# Patient Record
Sex: MALE | Race: WHITE | NOT HISPANIC OR LATINO | Employment: UNEMPLOYED | URBAN - METROPOLITAN AREA
[De-identification: names, ages, dates, MRNs, and addresses within clinical notes are randomized per-mention and may not be internally consistent; named-entity substitution may affect disease eponyms.]

---

## 2021-06-16 PROBLEM — K92.2 UPPER GI BLEED: Status: ACTIVE | Noted: 2017-07-10

## 2022-12-16 ENCOUNTER — HOSPITAL ENCOUNTER (OUTPATIENT)
Facility: CLINIC | Age: 32
Setting detail: OBSERVATION
Discharge: HOME | End: 2022-12-17
Attending: EMERGENCY MEDICINE | Admitting: STUDENT IN AN ORGANIZED HEALTH CARE EDUCATION/TRAINING PROGRAM
Payer: COMMERCIAL

## 2022-12-16 DIAGNOSIS — K92.0 HEMATEMESIS, UNSPECIFIED WHETHER NAUSEA PRESENT: ICD-10-CM

## 2022-12-16 DIAGNOSIS — Z78.9 ALCOHOL USE: Primary | ICD-10-CM

## 2022-12-16 LAB
ALBUMIN SERPL-MCNC: 4.2 G/DL (ref 3.5–5)
ALP SERPL-CCNC: 52 U/L (ref 45–120)
ALT SERPL W P-5'-P-CCNC: 37 U/L (ref 0–45)
ANION GAP SERPL CALCULATED.3IONS-SCNC: 12 MMOL/L (ref 5–18)
AST SERPL W P-5'-P-CCNC: 31 U/L (ref 0–40)
BASOPHILS # BLD AUTO: 0.1 10E3/UL (ref 0–0.2)
BASOPHILS NFR BLD AUTO: 1 %
BILIRUB DIRECT SERPL-MCNC: 0.2 MG/DL
BILIRUB SERPL-MCNC: 0.5 MG/DL (ref 0–1)
BUN SERPL-MCNC: 12 MG/DL (ref 8–22)
CALCIUM SERPL-MCNC: 8.8 MG/DL (ref 8.5–10.5)
CHLORIDE BLD-SCNC: 107 MMOL/L (ref 98–107)
CO2 SERPL-SCNC: 23 MMOL/L (ref 22–31)
CREAT SERPL-MCNC: 1.02 MG/DL (ref 0.7–1.3)
EOSINOPHIL # BLD AUTO: 0.3 10E3/UL (ref 0–0.7)
EOSINOPHIL NFR BLD AUTO: 4 %
ERYTHROCYTE [DISTWIDTH] IN BLOOD BY AUTOMATED COUNT: 11.6 % (ref 10–15)
ETHANOL SERPL-MCNC: 178 MG/DL
GFR SERPL CREATININE-BSD FRML MDRD: >90 ML/MIN/1.73M2
GLUCOSE BLD-MCNC: 97 MG/DL (ref 70–125)
HCT VFR BLD AUTO: 49.2 % (ref 40–53)
HGB BLD-MCNC: 15.6 G/DL (ref 13.3–17.7)
HGB BLD-MCNC: 16.3 G/DL (ref 13.3–17.7)
HGB BLD-MCNC: 17.1 G/DL (ref 13.3–17.7)
IMM GRANULOCYTES # BLD: 0 10E3/UL
IMM GRANULOCYTES NFR BLD: 0 %
INR PPP: 1.05 (ref 0.85–1.15)
LIPASE SERPL-CCNC: 31 U/L (ref 0–52)
LYMPHOCYTES # BLD AUTO: 2.5 10E3/UL (ref 0.8–5.3)
LYMPHOCYTES NFR BLD AUTO: 40 %
MAGNESIUM SERPL-MCNC: 2.1 MG/DL (ref 1.8–2.6)
MCH RBC QN AUTO: 31.6 PG (ref 26.5–33)
MCHC RBC AUTO-ENTMCNC: 34.8 G/DL (ref 31.5–36.5)
MCV RBC AUTO: 91 FL (ref 78–100)
MONOCYTES # BLD AUTO: 0.6 10E3/UL (ref 0–1.3)
MONOCYTES NFR BLD AUTO: 9 %
NEUTROPHILS # BLD AUTO: 2.7 10E3/UL (ref 1.6–8.3)
NEUTROPHILS NFR BLD AUTO: 46 %
NRBC # BLD AUTO: 0 10E3/UL
NRBC BLD AUTO-RTO: 0 /100
PHOSPHATE SERPL-MCNC: 2.6 MG/DL (ref 2.5–4.5)
PLATELET # BLD AUTO: 268 10E3/UL (ref 150–450)
POTASSIUM BLD-SCNC: 3.7 MMOL/L (ref 3.5–5)
PROT SERPL-MCNC: 7.3 G/DL (ref 6–8)
RBC # BLD AUTO: 5.41 10E6/UL (ref 4.4–5.9)
SODIUM SERPL-SCNC: 142 MMOL/L (ref 136–145)
WBC # BLD AUTO: 6.1 10E3/UL (ref 4–11)

## 2022-12-16 PROCEDURE — 258N000003 HC RX IP 258 OP 636: Performed by: STUDENT IN AN ORGANIZED HEALTH CARE EDUCATION/TRAINING PROGRAM

## 2022-12-16 PROCEDURE — C9113 INJ PANTOPRAZOLE SODIUM, VIA: HCPCS | Performed by: EMERGENCY MEDICINE

## 2022-12-16 PROCEDURE — 85610 PROTHROMBIN TIME: CPT | Performed by: EMERGENCY MEDICINE

## 2022-12-16 PROCEDURE — G0378 HOSPITAL OBSERVATION PER HR: HCPCS

## 2022-12-16 PROCEDURE — 250N000011 HC RX IP 250 OP 636: Performed by: STUDENT IN AN ORGANIZED HEALTH CARE EDUCATION/TRAINING PROGRAM

## 2022-12-16 PROCEDURE — 96374 THER/PROPH/DIAG INJ IV PUSH: CPT

## 2022-12-16 PROCEDURE — 82248 BILIRUBIN DIRECT: CPT | Performed by: EMERGENCY MEDICINE

## 2022-12-16 PROCEDURE — 250N000013 HC RX MED GY IP 250 OP 250 PS 637: Performed by: STUDENT IN AN ORGANIZED HEALTH CARE EDUCATION/TRAINING PROGRAM

## 2022-12-16 PROCEDURE — 84100 ASSAY OF PHOSPHORUS: CPT | Performed by: STUDENT IN AN ORGANIZED HEALTH CARE EDUCATION/TRAINING PROGRAM

## 2022-12-16 PROCEDURE — C9113 INJ PANTOPRAZOLE SODIUM, VIA: HCPCS | Performed by: STUDENT IN AN ORGANIZED HEALTH CARE EDUCATION/TRAINING PROGRAM

## 2022-12-16 PROCEDURE — 83735 ASSAY OF MAGNESIUM: CPT | Performed by: STUDENT IN AN ORGANIZED HEALTH CARE EDUCATION/TRAINING PROGRAM

## 2022-12-16 PROCEDURE — 96376 TX/PRO/DX INJ SAME DRUG ADON: CPT

## 2022-12-16 PROCEDURE — 250N000011 HC RX IP 250 OP 636: Performed by: EMERGENCY MEDICINE

## 2022-12-16 PROCEDURE — 85025 COMPLETE CBC W/AUTO DIFF WBC: CPT | Performed by: EMERGENCY MEDICINE

## 2022-12-16 PROCEDURE — 96375 TX/PRO/DX INJ NEW DRUG ADDON: CPT

## 2022-12-16 PROCEDURE — 82077 ASSAY SPEC XCP UR&BREATH IA: CPT | Performed by: EMERGENCY MEDICINE

## 2022-12-16 PROCEDURE — 36415 COLL VENOUS BLD VENIPUNCTURE: CPT | Performed by: STUDENT IN AN ORGANIZED HEALTH CARE EDUCATION/TRAINING PROGRAM

## 2022-12-16 PROCEDURE — 80053 COMPREHEN METABOLIC PANEL: CPT | Performed by: EMERGENCY MEDICINE

## 2022-12-16 PROCEDURE — 99285 EMERGENCY DEPT VISIT HI MDM: CPT | Mod: 25

## 2022-12-16 PROCEDURE — 250N000009 HC RX 250: Performed by: STUDENT IN AN ORGANIZED HEALTH CARE EDUCATION/TRAINING PROGRAM

## 2022-12-16 PROCEDURE — 83690 ASSAY OF LIPASE: CPT | Performed by: EMERGENCY MEDICINE

## 2022-12-16 PROCEDURE — 36415 COLL VENOUS BLD VENIPUNCTURE: CPT | Performed by: EMERGENCY MEDICINE

## 2022-12-16 PROCEDURE — 258N000003 HC RX IP 258 OP 636: Performed by: EMERGENCY MEDICINE

## 2022-12-16 PROCEDURE — 85018 HEMOGLOBIN: CPT | Performed by: STUDENT IN AN ORGANIZED HEALTH CARE EDUCATION/TRAINING PROGRAM

## 2022-12-16 PROCEDURE — 96361 HYDRATE IV INFUSION ADD-ON: CPT

## 2022-12-16 PROCEDURE — 99218 PR INITIAL OBSERVATION CARE,LEVEL I: CPT | Performed by: STUDENT IN AN ORGANIZED HEALTH CARE EDUCATION/TRAINING PROGRAM

## 2022-12-16 RX ORDER — MULTIVITAMIN,THERAPEUTIC
1 TABLET ORAL DAILY
COMMUNITY

## 2022-12-16 RX ORDER — ONDANSETRON 2 MG/ML
4 INJECTION INTRAMUSCULAR; INTRAVENOUS EVERY 6 HOURS PRN
Status: DISCONTINUED | OUTPATIENT
Start: 2022-12-16 | End: 2022-12-17

## 2022-12-16 RX ORDER — GABAPENTIN 600 MG/1
1200 TABLET ORAL ONCE
Status: COMPLETED | OUTPATIENT
Start: 2022-12-16 | End: 2022-12-16

## 2022-12-16 RX ORDER — DIAZEPAM 5 MG
10 TABLET ORAL EVERY 30 MIN PRN
Status: DISCONTINUED | OUTPATIENT
Start: 2022-12-16 | End: 2022-12-17 | Stop reason: HOSPADM

## 2022-12-16 RX ORDER — FLUMAZENIL 0.1 MG/ML
0.2 INJECTION, SOLUTION INTRAVENOUS
Status: DISCONTINUED | OUTPATIENT
Start: 2022-12-16 | End: 2022-12-17 | Stop reason: HOSPADM

## 2022-12-16 RX ORDER — GABAPENTIN 100 MG/1
100 CAPSULE ORAL EVERY 8 HOURS
Status: DISCONTINUED | OUTPATIENT
Start: 2022-12-23 | End: 2022-12-17 | Stop reason: HOSPADM

## 2022-12-16 RX ORDER — HALOPERIDOL 5 MG/ML
2.5-5 INJECTION INTRAMUSCULAR EVERY 6 HOURS PRN
Status: DISCONTINUED | OUTPATIENT
Start: 2022-12-16 | End: 2022-12-17 | Stop reason: HOSPADM

## 2022-12-16 RX ORDER — FOLIC ACID 1 MG/1
1 TABLET ORAL DAILY
Status: DISCONTINUED | OUTPATIENT
Start: 2022-12-17 | End: 2022-12-17 | Stop reason: HOSPADM

## 2022-12-16 RX ORDER — ONDANSETRON 4 MG/1
4 TABLET, ORALLY DISINTEGRATING ORAL EVERY 6 HOURS PRN
Status: DISCONTINUED | OUTPATIENT
Start: 2022-12-16 | End: 2022-12-17

## 2022-12-16 RX ORDER — TRAZODONE HYDROCHLORIDE 50 MG/1
50 TABLET, FILM COATED ORAL AT BEDTIME
Status: DISCONTINUED | OUTPATIENT
Start: 2022-12-16 | End: 2022-12-17 | Stop reason: HOSPADM

## 2022-12-16 RX ORDER — MULTIPLE VITAMINS W/ MINERALS TAB 9MG-400MCG
1 TAB ORAL DAILY
Status: DISCONTINUED | OUTPATIENT
Start: 2022-12-17 | End: 2022-12-17 | Stop reason: HOSPADM

## 2022-12-16 RX ORDER — GABAPENTIN 300 MG/1
900 CAPSULE ORAL EVERY 8 HOURS
Status: DISCONTINUED | OUTPATIENT
Start: 2022-12-16 | End: 2022-12-17 | Stop reason: HOSPADM

## 2022-12-16 RX ORDER — DIAZEPAM 10 MG/2ML
5-10 INJECTION, SOLUTION INTRAMUSCULAR; INTRAVENOUS EVERY 30 MIN PRN
Status: DISCONTINUED | OUTPATIENT
Start: 2022-12-16 | End: 2022-12-17 | Stop reason: HOSPADM

## 2022-12-16 RX ORDER — GABAPENTIN 300 MG/1
300 CAPSULE ORAL EVERY 8 HOURS
Status: DISCONTINUED | OUTPATIENT
Start: 2022-12-21 | End: 2022-12-17 | Stop reason: HOSPADM

## 2022-12-16 RX ORDER — CLONIDINE HYDROCHLORIDE 0.1 MG/1
0.1 TABLET ORAL EVERY 8 HOURS
Status: DISCONTINUED | OUTPATIENT
Start: 2022-12-16 | End: 2022-12-17 | Stop reason: HOSPADM

## 2022-12-16 RX ORDER — GABAPENTIN 300 MG/1
600 CAPSULE ORAL EVERY 8 HOURS
Status: DISCONTINUED | OUTPATIENT
Start: 2022-12-19 | End: 2022-12-17 | Stop reason: HOSPADM

## 2022-12-16 RX ORDER — TRAZODONE HYDROCHLORIDE 50 MG/1
50 TABLET, FILM COATED ORAL AT BEDTIME
COMMUNITY

## 2022-12-16 RX ADMIN — GABAPENTIN 900 MG: 300 CAPSULE ORAL at 21:03

## 2022-12-16 RX ADMIN — PANTOPRAZOLE SODIUM 40 MG: 40 INJECTION, POWDER, FOR SOLUTION INTRAVENOUS at 21:03

## 2022-12-16 RX ADMIN — ONDANSETRON 4 MG: 2 INJECTION INTRAMUSCULAR; INTRAVENOUS at 13:23

## 2022-12-16 RX ADMIN — SODIUM CHLORIDE 1000 ML: 9 INJECTION, SOLUTION INTRAVENOUS at 07:34

## 2022-12-16 RX ADMIN — PANTOPRAZOLE SODIUM 40 MG: 40 INJECTION, POWDER, FOR SOLUTION INTRAVENOUS at 07:34

## 2022-12-16 RX ADMIN — CLONIDINE HYDROCHLORIDE 0.1 MG: 0.1 TABLET ORAL at 21:04

## 2022-12-16 RX ADMIN — CLONIDINE HYDROCHLORIDE 0.1 MG: 0.1 TABLET ORAL at 13:03

## 2022-12-16 RX ADMIN — THIAMINE HYDROCHLORIDE: 100 INJECTION, SOLUTION INTRAMUSCULAR; INTRAVENOUS at 13:57

## 2022-12-16 RX ADMIN — GABAPENTIN 1200 MG: 600 TABLET, FILM COATED ORAL at 13:03

## 2022-12-16 RX ADMIN — TRAZODONE HYDROCHLORIDE 50 MG: 50 TABLET ORAL at 21:03

## 2022-12-16 ASSESSMENT — ACTIVITIES OF DAILY LIVING (ADL)
ADLS_ACUITY_SCORE: 18
ADLS_ACUITY_SCORE: 18
ADLS_ACUITY_SCORE: 35
ADLS_ACUITY_SCORE: 18
DEPENDENT_IADLS:: INDEPENDENT
ADLS_ACUITY_SCORE: 35
ADLS_ACUITY_SCORE: 18
ADLS_ACUITY_SCORE: 35

## 2022-12-16 NOTE — CONSULTS
Care Management Initial Consult    General Information  Assessment completed with: Patient,    Type of CM/SW Visit: Initial Assessment    Primary Care Provider verified and updated as needed: No   Readmission within the last 30 days: no previous admission in last 30 days      Reason for Consult: discharge planning, substance use concerns  Advance Care Planning:            Communication Assessment  Patient's communication style: spoken language (English or Bilingual)             Cognitive  Cognitive/Neuro/Behavioral: WDL                      Living Environment:   People in home: parent(s)     Current living Arrangements: house      Able to return to prior arrangements: yes       Family/Social Support:  Care provided by: self  Provides care for: no one  Marital Status: Single  Parent(s)          Description of Support System: Supportive, Involved    Support Assessment: Adequate family and caregiver support, Adequate social supports    Current Resources:   Patient receiving home care services: No     Community Resources: None  Equipment currently used at home: none  Supplies currently used at home: None    Employment/Financial:  Employment Status: unemployed        Financial Concerns: No concerns identified   Referral to Financial Worker: No       Lifestyle & Psychosocial Needs:  Social Determinants of Health     Tobacco Use: Unknown     Smoking Tobacco Use: Never     Smokeless Tobacco Use: Unknown     Passive Exposure: Not on file   Alcohol Use: Not on file   Financial Resource Strain: Not on file   Food Insecurity: Not on file   Transportation Needs: Not on file   Physical Activity: Not on file   Stress: Not on file   Social Connections: Not on file   Intimate Partner Violence: Not on file   Depression: Not on file   Housing Stability: Not on file       Functional Status:  Prior to admission patient needed assistance:   Dependent ADLs:: Independent  Dependent IADLs:: Independent  Assesssment of Functional Status: At  functional baseline    Mental Health Status:  Mental Health Status: No Current Concerns       Chemical Dependency Status:  Chemical Dependency Status: No Current Concerns             Values/Beliefs:  Spiritual, Cultural Beliefs, Uatsdin Practices, Values that affect care: no               Additional Information:  CINTHIA assessed. Pt resides at home with his parents, he is independent at baseline, but does not work or drive. Pt endorses hx of alcohol abuse, he reports starting to drink at 16 years old, and states that he has never had a period of sobriety since then. He does report completing an outpatient CD program about a year ago and states that he is interested in another OP CD program. CM placed Rule 25 information in pt AVS.    FRANK Zarate

## 2022-12-16 NOTE — PHARMACY-ADMISSION MEDICATION HISTORY
Pharmacy Note - Admission Medication History    Pertinent Provider Information:  None     ______________________________________________________________________    Prior To Admission (PTA) med list completed and updated in EMR.       PTA Med List   Medication Sig Last Dose     multivitamin, therapeutic (THERA-VIT) TABS tablet Take 1 tablet by mouth daily Past Week     traZODone (DESYREL) 50 MG tablet Take 50 mg by mouth At Bedtime Past Week       Information source(s): [facility]  Method of interview communication: in-person    Summary of Changes to PTA Med List  New: Trazodone  Discontinued: NA  Changed: NA    Patient was asked about OTC/herbal products specifically.  PTA med list reflects this.    In the past week, patient estimated taking medication this percent of the time:  Unable to assess    Allergies were reviewed, assessed, and updated with the patient.      Patient does not use any multi-dose medications prior to admission.    The information provided in this note is only as accurate as the sources available at the time of the update(s).    Thank you for the opportunity to participate in the care of this patient.    Swathi Figueroa RPH  12/16/2022 11:00 AM

## 2022-12-16 NOTE — DISCHARGE INSTRUCTIONS
In order to get into chemical dependency treatment you must follow these steps:   Complete a Rule 25 assessment with any of the providers listed below.     [facility]  [phone]  [address] [place] [id number]  Patients with Medical Assistance from [place] or [facility] are covered, otherwise you need to have a PMAP or private insurance.  Rule 25s are available at clinics in [place], [facility], [place], and [place].  Call ahead to schedule an appointment, no walk in availability.     [facility]  [phone]  [address] [address]  Assessments done Monday through Friday 8am-7pm.  Call to schedule an appointment, walk ins are accommodated if possible.  Insurance coverage is verified.  If insurance covers 80 percent or more a payment schedule may be arranged with the business office.     [facility]  [phone]  [address] [place] [address]  Call to schedule an assessment.  Locations include [facility], [facility], and [place].    [facility]  [phone] - Press [address]  Call to schedule an assessment.  No walk in availability.  Only able to accept PMAP and private insurance.  Locations include [place], [facility], [place], [place], [place], [place], [place], [facility], and [place].

## 2022-12-16 NOTE — H&P
"[facility] MEDICINE ADMISSION HISTORY AND PHYSICAL     Assessment & Plan       Patrick Ba is a 32 year man with past medical history significant for insomnia, alcohol dependence who presented to the hospital on 12/16 with 1 episodes of hematemesis.      #Epistaxis  #Questionable hematemesis  -Epistaxis with possible hematemesis after an episode of vomiting.  -No further episode of vomiting since admission.  -Possible etiologies include epistaxis.   -If the patient truly had hematemesis, possible etiologies include esophageal submucosal tear in the setting of vomiting, although less likely given no reported pain and only 1 episode of vomiting.  Other possible etiologies include PUD versus varices bleeding.    Plan:  [] Check hemoglobin  [] Continue to monitor vitals  [] If the patient had another episode of hematemesis or melena, will consult GI otherwise he can follow-up with primary care physician and GI as an outpatient.  [] Start PPI 40 mg BID      #Alcohol use disorder  -Started drinking at the age of 17  -Drinks around 1 bottle of hard alcohol (750 mL) per day.  -No history of cirrhosis.  -Liver enzymes and INR within normal limits.    Plan:  [] CIWA protocol  [] Gabapentin tapering dose  [] Start thiamine and folic acid  []  consult        #Insomnia    Plan:  [] Continue home trazodone.        Clinically Significant Risk Factors Present on Admission                       # Overweight: Estimated body mass index is 28.7 kg/m  as calculated from the following:    Height as of this encounter: 1.778 m (5' 10\").    Weight as of this encounter: 90.7 kg (200 lb).             DVTP: Low Risk Patient   Code Status: No Order  Disposition: Observation   Expected LOS: 1 day  Goals for the hospitalization: Monitor for any signs of GI bleed.  Disposition Plan Home     Expected Discharge Date: 12/17/2022                The patient's care was discussed with the Patient.  Chief Complaint "  Bosebleed and possible hematemesis     HISTORY     Patrick Ba is a 32 year man with past medical history significant for alcohol use disorder who presented to the hospital with epistaxis versus an episode of hematemesis.    Patient was in usual state of health until earlier today 12/16/2022, he was trying to have breakfast.  He had a few bites of a sandwich and shortly after he started to experience nausea and had 1 episode of vomiting, bright bloody material along with nosebleed.  The nosebleed has subsided spontaneously within a few minutes.     Patient denies any abdominal pain, melena, hematochezia, chest pain or shortness of breath.      Vitals on presentation blood pressure around 150/80, heart rate around 100, SPO2 96% on room air.  Labs is largely unremarkable, no leukocytosis, liver enzymes within normal limits, bilirubin normal.  Alcohol level elevated around 180.  Patient drinks around a bottle of vodka daily and his last alcoholic drink was yesterday 12/15/2022.  He never experienced alcohol withdrawal symptoms in the past as he never stopped drinking alcohol.  He started drinking around age of 16.       Past Medical History     No past medical history on file.     Surgical History   History reviewed. No pertinent surgical history.  Family History    Reviewed, and History reviewed. No pertinent family history.     Social History      Social History     Tobacco Use     Smoking status: Never   Substance Use Topics     Alcohol use: Yes     Comment: Alcoholic Drinks/day: 1/2 liter per day of hard alcohol      Drug use: Yes     Types: Marijuana        Allergies   No Known Allergies  Prior to Admission Medications      Prior to Admission Medications   Prescriptions Last Dose Informant Patient Reported? Taking?   multivitamin, therapeutic (THERA-VIT) TABS tablet Past Week  Yes Yes   Sig: Take 1 tablet by mouth daily   traZODone (DESYREL) 50 MG tablet Past Week  Yes Yes   Sig: Take 50 mg by mouth At Bedtime       Facility-Administered Medications: None      Review of Systems     A 12 point comprehensive review of systems was negative except as noted above in HPI.    PHYSICAL EXAMINATION       Vitals      Temp:  [98.8  F (37.1  C)] 98.8  F (37.1  C)  Pulse:  [] 80  Resp:  [20] 20  BP: (118-150)/() 127/82  SpO2:  [95 %-97 %] 96 %    Examination     Physical Exam  Constitutional:       General: He is not in acute distress.     Appearance: Normal appearance. He is not ill-appearing.   HENT:      Nose: Nose normal. No congestion or rhinorrhea.      Mouth/Throat:      Mouth: Mucous membranes are moist.      Pharynx: Oropharynx is clear.   Cardiovascular:      Rate and Rhythm: Normal rate.      Heart sounds: Murmur heard.   Pulmonary:      Effort: Pulmonary effort is normal. No respiratory distress.      Breath sounds: No wheezing.   Abdominal:      General: Abdomen is flat. There is no distension.      Palpations: Abdomen is soft.      Tenderness: There is no abdominal tenderness. There is no guarding or rebound.   Skin:     General: Skin is warm and dry.   Neurological:      General: No focal deficit present.      Mental Status: He is alert and oriented to person, place, and time. Mental status is at baseline.      Cranial Nerves: No cranial nerve deficit.      Motor: No weakness.   Psychiatric:         Mood and Affect: Mood normal.         Behavior: Behavior normal.         Pertinent Lab     Most Recent 3 CBC's:Recent Labs   Lab Test 12/16/22  0723   WBC 6.1   HGB 17.1   MCV 91        Most Recent 3 BMP's:Recent Labs   Lab Test 12/16/22  0723      POTASSIUM 3.7   CHLORIDE 107   CO2 23   BUN 12   CR 1.02   ANIONGAP 12   ADELA 8.8   GLC 97         Pertinent Radiology     Radiology Results: No results found for this or any previous visit (from the past 24 hour(s)).      Advance Care Planning        NATE PEREZ MD  [facility]   Phone:  #[phone]

## 2022-12-16 NOTE — ED TRIAGE NOTES
Pt presents to the ED with c/o of sudden onset of nausea followed by emesis x 1.  Pt reports bright red blood in his vomit. Pt has hx of ETOH. Last drink was last night. Pt reports this happening about 5 years ago.      Triage Assessment     Row Name 12/16/22 0634       Triage Assessment (Adult)    Airway WDL WDL       Respiratory WDL    Respiratory WDL WDL       Skin Circulation/Temperature WDL    Skin Circulation/Temperature WDL WDL       Cardiac WDL    Cardiac WDL WDL       Peripheral/Neurovascular WDL    Peripheral Neurovascular WDL WDL       Cognitive/Neuro/Behavioral WDL    Cognitive/Neuro/Behavioral WDL WDL

## 2022-12-16 NOTE — ED PROVIDER NOTES
EMERGENCY DEPARTMENT ENCOUNTER      NAME: Patrick Ba  AGE: 32 year old male  YOB: 1990  MRN: [id number]  EVALUATION DATE & TIME: No admission date for patient encounter.    PCP: No Ref-Primary, Physician    ED PROVIDER: Cade Brownlee D.O.      Chief Complaint   Patient presents with     Hematemesis     Hx of ETOH       FINAL IMPRESSION:  1. Hematemesis, unspecified whether nausea present        ED COURSE & MEDICAL DECISION MAKIN:38 AM I met with the patient to gather history and to perform my initial exam. I discussed the plan for care while in the Emergency Department. PPE worn: surgical mask, gloves   7:03 AM I spoke to Dr. Boyle, [place]. Recommended to continue observation. If patient vomits, then place order for EGD. Otherwise, reevaluate and discuss for a new plan.   8:19 AM I discussed the case with hospitalist, Dr Sauceda, who accepts the patient.         Pertinent Labs & Imaging studies reviewed. (See chart for details)  32 year old male presents to the Emergency Department for evaluation of vomiting blood.  Patient had a similar episode several years ago, never did follow-up for his EGD.  There is no abdominal pain on exam, his hemoglobin is stable, and he was tachycardic upon arrival that is did improve after IV fluids.  Imaging was not performed as there is no pain.  I did give Protonix, but did not give octreotide.  I discussed the patient with gastroenterology.  This time the plan will be for observation and serial hemoglobins.  Do not believe that an emergent EGD is necessary, but that may change as the patient is monitored.  Discussed with the hospitalist who agreed to the observation.    Medical Decision Making    History:    Supplemental history from: Documented in HPI, if applicable    External Record(s) reviewed: Documented in HPI, if applicable.    Work Up:    Chart documentation includes differential considered and any EKGs or imaging interpreted by provider.    In  "additional to work up documented, I considered the following work up: See chart documentation, if applicable.    External consultation:    Discussion of management with another provider: Hospitalist and GI    Complicating factors:    Care impacted by chronic illness: None    Care affected by social determinants of health: Alcohol Abuse and/or Recreational Drug Use    Disposition considerations: Admit.        At the conclusion of the encounter I discussed the results of all of the tests and the disposition. The questions were answered. The patient or family acknowledged understanding and was agreeable with the care plan.        HPI    Patient information was obtained from: Patient     Use of : N/A        Patrick Ba is a 32 year old male who presents hematemesis.       Approximately 30 minutes prior to arrival, the patient reports that he was eating Doritos when he developed nausea and had an episode of hematemesis. He adds that he had a nosebleed afterwards. He denies black/bloody stools and abdominal pain. The patient states that he had similar symptoms ~5 years ago, but never had a follow-up. He denies having any medical issues, but reports of ETOH use. The patient states that he has been an alcoholic for the past ~15 years and finishes several 750 bottles 2-3 times throughout the week. Currently in the ED, he feels \"fine.\" He otherwise denies fevers, chest pain, shortness of breath, and any other symptoms or complaints at this time.     Per chart review, the patient was admitted to the hospital 7/9/17-7/10/17 (~5 years) for UGIB with hematemesis. Gastric occult blood was positive consistent with his hematemesis. There was a slight leukocytosis of 11.4. Basic metabolic panel was unremarkable except for slight elevation of bilirubin of 1.5, rest of the LFTs are normal. Renal function was normal. Lipase was normal. INR was normal. Chest x-ray was unremarkable. HGB was steady 16.5; nl INR. GI consulted - " "Dr Snyder reviewed case and recommended that patient can have EGD as outpatient as he has been stable.    Social Hx: Alcohol User. Former Smoker.     REVIEW OF SYSTEMS  Constitutional:  Denies fever, chills, weight loss or weakness  Eyes:  No pain, discharge, redness  HENT: Positive for nosebleed. Denies sore throat, ear pain, congestion  Respiratory: No SOB, wheeze or cough  Cardiovascular:  No CP, palpitations  GI: Positive for nausea and hematemesis. Denies abdominal pain, diarrhea, black/bloody stools  : Denies dysuria, hematuria  Musculoskeletal:  Denies any new muscle/joint pain, swelling or loss of function.  Skin:  Denies rash, pallor  Neurologic:  Denies headache, focal weakness or sensory changes  Lymph: Denies swollen nodes    All other systems negative unless noted in HPI.    PAST MEDICAL HISTORY:  History reviewed. No pertinent past medical history.    PAST SURGICAL HISTORY:  History reviewed. No pertinent surgical history.      CURRENT MEDICATIONS:    No current facility-administered medications for this encounter.     Current Outpatient Medications   Medication     cyclobenzaprine (FLEXERIL) 10 MG tablet     omeprazole (PRILOSEC) 20 MG capsule         ALLERGIES:  No Known Allergies    FAMILY HISTORY:  History reviewed. No pertinent family history.    SOCIAL HISTORY:  Social History     Socioeconomic History     Marital status: Single   Tobacco Use     Smoking status: Never   Substance and Sexual Activity     Alcohol use: Yes     Comment: Alcoholic Drinks/day: 1/2 liter per day of hard alcohol      Drug use: Yes     Types: Marijuana       VITALS:  Patient Vitals for the past 24 hrs:   BP Temp Temp src Pulse Resp SpO2 Height Weight   12/16/22 0631 (!) 150/108 98.8  F (37.1  C) Oral 113 20 95 % 1.778 m (5' 10\") 90.7 kg (200 lb)       PHYSICAL EXAM    VITAL SIGNS: BP (!) 150/108   Pulse 113   Temp 98.8  F (37.1  C) (Oral)   Resp 20   Ht 1.778 m (5' 10\")   Wt 90.7 kg (200 lb)   SpO2 95%   BMI " 28.70 kg/m      General Appearance: Well-appearing, well-nourished, no acute distress   Head:  Normocephalic, without obvious abnormality, atraumatic  Eyes:  PERRL, conjunctiva/corneas clear, EOM's intact,  ENT:  Lips, mucosa, and tongue normal, membranes are moist without pallor  Neck:  Normal ROM, symmetrical, trachea midline    Cardio:  Borderline tachycardic and regular rhythm, no murmur, rub or gallop, 2+ pulses symmetric in all extremities  Pulm:  Clear to auscultation bilaterally, respirations unlabored,  Abdomen:  Soft, non-tender, no rebound or guarding.  Musculoskeletal: Full ROM, no edema, no cyanosis, good ROM of major joints  Integument:  Warm, Dry, No erythema, No rash.    Neurologic:  Alert & oriented.  No focal deficits appreciated.  Ambulatory.  Psychiatric:  Affect normal, Judgment normal, Mood normal.      LABS  Results for orders placed or performed during the hospital encounter of 12/16/22 (from the past 24 hour(s))   CBC with platelets + differential    Narrative    The following orders were created for panel order CBC with platelets + differential.  Procedure                               Abnormality         Status                     ---------                               -----------         ------                     CBC with platelets and d...[[id number]]                      Final result                 Please view results for these tests on the individual orders.   Basic metabolic panel   Result Value Ref Range    Sodium 142 136 - 145 mmol/L    Potassium 3.7 3.5 - 5.0 mmol/L    Chloride 107 98 - 107 mmol/L    Carbon Dioxide (CO2) 23 22 - 31 mmol/L    Anion Gap 12 5 - 18 mmol/L    Urea Nitrogen 12 8 - 22 mg/dL    Creatinine 1.02 0.70 - 1.30 mg/dL    Calcium 8.8 8.5 - 10.5 mg/dL    Glucose 97 70 - 125 mg/dL    GFR Estimate >90 >60 mL/min/1.73m2   Hepatic function panel   Result Value Ref Range    Bilirubin Total 0.5 0.0 - 1.0 mg/dL    Bilirubin Direct 0.2 <=0.5 mg/dL    Protein Total 7.3  6.0 - 8.0 g/dL    Albumin 4.2 3.5 - 5.0 g/dL    Alkaline Phosphatase 52 45 - 120 U/L    AST 31 0 - 40 U/L    ALT 37 0 - 45 U/L   Lipase   Result Value Ref Range    Lipase 31 0 - 52 U/L   INR   Result Value Ref Range    INR 1.05 0.85 - 1.15   Alcohol level blood   Result Value Ref Range    Alcohol, Blood 178 (H) None detected mg/dL   CBC with platelets and differential   Result Value Ref Range    WBC Count 6.1 4.0 - 11.0 10e3/uL    RBC Count 5.41 4.40 - 5.90 10e6/uL    Hemoglobin 17.1 13.3 - 17.7 g/dL    Hematocrit 49.2 40.0 - 53.0 %    MCV 91 78 - 100 fL    MCH 31.6 26.5 - 33.0 pg    MCHC 34.8 31.5 - 36.5 g/dL    RDW 11.6 10.0 - 15.0 %    Platelet Count 268 150 - 450 10e3/uL    % Neutrophils 46 %    % Lymphocytes 40 %    % Monocytes 9 %    % Eosinophils 4 %    % Basophils 1 %    % Immature Granulocytes 0 %    NRBCs per 100 WBC 0 <1 /100    Absolute Neutrophils 2.7 1.6 - 8.3 10e3/uL    Absolute Lymphocytes 2.5 0.8 - 5.3 10e3/uL    Absolute Monocytes 0.6 0.0 - 1.3 10e3/uL    Absolute Eosinophils 0.3 0.0 - 0.7 10e3/uL    Absolute Basophils 0.1 0.0 - 0.2 10e3/uL    Absolute Immature Granulocytes 0.0 <=0.4 10e3/uL    Absolute NRBCs 0.0 10e3/uL         RADIOLOGY  No orders to display            MEDICATIONS GIVEN IN THE EMERGENCY:  Medications   pantoprazole (PROTONIX) IV push injection 40 mg (40 mg Intravenous Given 12/16/22 0734)   0.9% sodium chloride BOLUS (1,000 mLs Intravenous New Bag 12/16/22 0734)       NEW PRESCRIPTIONS STARTED AT TODAY'S ER VISIT  New Prescriptions    No medications on file        I, Joni Segura, am serving as a scribe to document services personally performed by Cade Brownlee D.O., based on my observations and the provider's statements to me.  I, Cade Brownlee D.O., attest that Joni Segura is acting in a scribe capacity, has observed my performance of the services and has documented them in accordance with my direction.     Cade Brownlee D.O.  Emergency Medicine  [facility]  [place] EMERGENCY ROOM  [facility] [facility] [address]  [phone]  Dept: [phone]     Cade Brownlee DO  12/16/22 1116

## 2022-12-17 VITALS
HEART RATE: 86 BPM | OXYGEN SATURATION: 98 % | WEIGHT: 200 LBS | BODY MASS INDEX: 28.63 KG/M2 | DIASTOLIC BLOOD PRESSURE: 83 MMHG | HEIGHT: 70 IN | TEMPERATURE: 97.7 F | SYSTOLIC BLOOD PRESSURE: 125 MMHG | RESPIRATION RATE: 18 BRPM

## 2022-12-17 LAB
ALBUMIN SERPL-MCNC: 4 G/DL (ref 3.5–5)
ALP SERPL-CCNC: 51 U/L (ref 45–120)
ALT SERPL W P-5'-P-CCNC: 32 U/L (ref 0–45)
ANION GAP SERPL CALCULATED.3IONS-SCNC: 10 MMOL/L (ref 5–18)
AST SERPL W P-5'-P-CCNC: 25 U/L (ref 0–40)
BASOPHILS # BLD AUTO: 0.1 10E3/UL (ref 0–0.2)
BASOPHILS NFR BLD AUTO: 1 %
BILIRUB SERPL-MCNC: 1.4 MG/DL (ref 0–1)
BUN SERPL-MCNC: 10 MG/DL (ref 8–22)
CALCIUM SERPL-MCNC: 9.2 MG/DL (ref 8.5–10.5)
CHLORIDE BLD-SCNC: 106 MMOL/L (ref 98–107)
CO2 SERPL-SCNC: 22 MMOL/L (ref 22–31)
CREAT SERPL-MCNC: 0.92 MG/DL (ref 0.7–1.3)
EOSINOPHIL # BLD AUTO: 0.4 10E3/UL (ref 0–0.7)
EOSINOPHIL NFR BLD AUTO: 6 %
ERYTHROCYTE [DISTWIDTH] IN BLOOD BY AUTOMATED COUNT: 11.3 % (ref 10–15)
GFR SERPL CREATININE-BSD FRML MDRD: >90 ML/MIN/1.73M2
GLUCOSE BLD-MCNC: 110 MG/DL (ref 70–125)
HCT VFR BLD AUTO: 47.3 % (ref 40–53)
HGB BLD-MCNC: 16.4 G/DL (ref 13.3–17.7)
IMM GRANULOCYTES # BLD: 0 10E3/UL
IMM GRANULOCYTES NFR BLD: 0 %
LYMPHOCYTES # BLD AUTO: 1.9 10E3/UL (ref 0.8–5.3)
LYMPHOCYTES NFR BLD AUTO: 29 %
MCH RBC QN AUTO: 30.9 PG (ref 26.5–33)
MCHC RBC AUTO-ENTMCNC: 34.7 G/DL (ref 31.5–36.5)
MCV RBC AUTO: 89 FL (ref 78–100)
MONOCYTES # BLD AUTO: 0.6 10E3/UL (ref 0–1.3)
MONOCYTES NFR BLD AUTO: 9 %
NEUTROPHILS # BLD AUTO: 3.5 10E3/UL (ref 1.6–8.3)
NEUTROPHILS NFR BLD AUTO: 55 %
NRBC # BLD AUTO: 0 10E3/UL
NRBC BLD AUTO-RTO: 0 /100
PLATELET # BLD AUTO: 253 10E3/UL (ref 150–450)
POTASSIUM BLD-SCNC: 4 MMOL/L (ref 3.5–5)
PROT SERPL-MCNC: 6.6 G/DL (ref 6–8)
RBC # BLD AUTO: 5.3 10E6/UL (ref 4.4–5.9)
SODIUM SERPL-SCNC: 138 MMOL/L (ref 136–145)
WBC # BLD AUTO: 6.4 10E3/UL (ref 4–11)

## 2022-12-17 PROCEDURE — 85004 AUTOMATED DIFF WBC COUNT: CPT | Performed by: STUDENT IN AN ORGANIZED HEALTH CARE EDUCATION/TRAINING PROGRAM

## 2022-12-17 PROCEDURE — 80053 COMPREHEN METABOLIC PANEL: CPT | Performed by: STUDENT IN AN ORGANIZED HEALTH CARE EDUCATION/TRAINING PROGRAM

## 2022-12-17 PROCEDURE — 99217 PR OBSERVATION CARE DISCHARGE: CPT | Performed by: STUDENT IN AN ORGANIZED HEALTH CARE EDUCATION/TRAINING PROGRAM

## 2022-12-17 PROCEDURE — 96376 TX/PRO/DX INJ SAME DRUG ADON: CPT

## 2022-12-17 PROCEDURE — C9113 INJ PANTOPRAZOLE SODIUM, VIA: HCPCS | Performed by: STUDENT IN AN ORGANIZED HEALTH CARE EDUCATION/TRAINING PROGRAM

## 2022-12-17 PROCEDURE — 36415 COLL VENOUS BLD VENIPUNCTURE: CPT | Performed by: STUDENT IN AN ORGANIZED HEALTH CARE EDUCATION/TRAINING PROGRAM

## 2022-12-17 PROCEDURE — 250N000011 HC RX IP 250 OP 636: Performed by: STUDENT IN AN ORGANIZED HEALTH CARE EDUCATION/TRAINING PROGRAM

## 2022-12-17 PROCEDURE — 250N000013 HC RX MED GY IP 250 OP 250 PS 637: Performed by: STUDENT IN AN ORGANIZED HEALTH CARE EDUCATION/TRAINING PROGRAM

## 2022-12-17 PROCEDURE — G0378 HOSPITAL OBSERVATION PER HR: HCPCS

## 2022-12-17 RX ORDER — FOLIC ACID 1 MG/1
1 TABLET ORAL DAILY
Qty: 30 TABLET | Refills: 0 | Status: SHIPPED | OUTPATIENT
Start: 2022-12-18 | End: 2023-01-17

## 2022-12-17 RX ORDER — LANOLIN ALCOHOL/MO/W.PET/CERES
100 CREAM (GRAM) TOPICAL DAILY
Qty: 30 TABLET | Refills: 0 | Status: SHIPPED | OUTPATIENT
Start: 2022-12-18 | End: 2023-01-17

## 2022-12-17 RX ADMIN — FOLIC ACID 1 MG: 1 TABLET ORAL at 07:45

## 2022-12-17 RX ADMIN — GABAPENTIN 900 MG: 300 CAPSULE ORAL at 04:18

## 2022-12-17 RX ADMIN — Medication 100 MG: at 08:10

## 2022-12-17 RX ADMIN — MULTIPLE VITAMINS W/ MINERALS TAB 1 TABLET: TAB at 07:45

## 2022-12-17 RX ADMIN — CLONIDINE HYDROCHLORIDE 0.1 MG: 0.1 TABLET ORAL at 04:18

## 2022-12-17 RX ADMIN — PANTOPRAZOLE SODIUM 40 MG: 40 INJECTION, POWDER, FOR SOLUTION INTRAVENOUS at 08:49

## 2022-12-17 ASSESSMENT — ACTIVITIES OF DAILY LIVING (ADL)
ADLS_ACUITY_SCORE: 18

## 2022-12-17 NOTE — PROGRESS NOTES
"PRIMARY DIAGNOSIS: \"GENERIC\" NURSING  OUTPATIENT/OBSERVATION GOALS TO BE MET BEFORE DISCHARGE:  1. ADLs back to baseline: Yes    2. Activity and level of assistance: Ambulating independently.    3. Pain status: Pain free.    4. Return to near baseline physical activity: Yes     Discharge Planner Nurse   Safe discharge environment identified: Yes  Barriers to discharge: Yes; awaiting medical clearance.       Entered by: Alicia Dietz RN 12/16/2022 10:21 PM     Please review provider order for any additional goals.   Nurse to notify provider when observation goals have been met and patient is ready for discharge.    CIWA-0. VSS. Denies pain and abdominal discomfort. No N/V this shift. Will continue to monitor.   "

## 2022-12-17 NOTE — DISCHARGE SUMMARY
[facility] MEDICINE  DISCHARGE SUMMARY     Primary Care Physician: No Ref-Primary, Physician  Admission Date: 12/16/2022   Discharge Provider: NATE PEREZ MD Discharge Date: 12/17/2022   Diet:   Active Diet and Nourishment Order   Procedures     Regular Diet Adult       Code Status: Full Code   Activity: DCACTIVITY: Activity as tolerated        Condition at Discharge: Satisfactory     REASON FOR PRESENTATION(See Admission Note for Details)   Nosebleed and possible hematemesis    PRINCIPAL & ACTIVE DISCHARGE DIAGNOSES     Active Problems:    * No active hospital problems. *      PENDING LABS     Unresulted Labs Ordered in the Past 30 Days of this Admission     No orders found for last 31 day(s).          RECOMMENDATIONS TO OUTPATIENT PROVIDER FOR F/U VISIT   -Continue to encourage alcohol cessation  -Monitor liver function as an outpatient and consider discharge GI referral if removed.    DISPOSITION     Home    SUMMARY OF HOSPITAL COURSE:      Patrick Ba is a 32 year man with past medical history significant for insomnia, alcohol dependence who presented to the hospital on 12/16 with 1 episodes of epistaxis and possible hematemesis.  Hemoglobin has been stable.  No further episodes of hematemesis or epistaxis during this hospitalization.  Patient does not have any abdominal pain nausea or vomiting.  He has been tolerating his diet well.  No signs of active GI bleed.  No history of varices.  His liver enzymes was stable.         #Epistaxis  #Questionable hematemesis  -Epistaxis with possible hematemesis after an episode of vomiting.  -No further episode of vomiting since admission.  -Possible etiologies include epistaxis.   -No signs of active GI bleed.    Plan:  [] Continue to monitor status at home.  [] Follow-up with primary care physician as an outpatient.       #Alcohol use disorder  -Started drinking at the age of 17  -Drinks around 1 bottle of hard alcohol (750 mL)  per day.  -No history of cirrhosis.  -Liver enzymes and INR within normal limits.  -Was put on CIWA protocol during hospitalization.  No signs of alcohol withdrawal.  -Was started on tapering dose gabapentin along with thiamine and folic acid.  -Patient was educated about the importance of alcohol cessation.    Plan:    []  Discharge with folic and thiamine.       #Insomnia     Plan:  [] Continue home trazodone.    Discharge Medications with Med changes:     Current Discharge Medication List      START taking these medications    Details   folic acid (FOLVITE) 1 MG tablet Take 1 tablet (1 mg) by mouth daily for 30 days  Qty: 30 tablet, Refills: 0    Associated Diagnoses: Alcohol use      thiamine (B-1) 100 MG tablet Take 1 tablet (100 mg) by mouth daily for 30 days  Qty: 30 tablet, Refills: 0    Associated Diagnoses: Alcohol use         CONTINUE these medications which have NOT CHANGED    Details   multivitamin, therapeutic (THERA-VIT) TABS tablet Take 1 tablet by mouth daily      traZODone (DESYREL) 50 MG tablet Take 50 mg by mouth At Bedtime                   Consults     CARE MANAGEMENT / SOCIAL WORK IP CONSULT    Immunizations given this encounter     Most Recent Immunizations   Administered Date(s) Administered     COVID-19 Vaccine Bivalent Booster 12+ (Pfizer) 10/21/2022           Anticoagulation Information      Recent INR results:   Recent Labs   Lab 12/16/22  0723   INR 1.05           SIGNIFICANT IMAGING FINDINGS     No results found for this visit on 12/16/22.    SIGNIFICANT LABORATORY FINDINGS     Most Recent 3 CBC's:Recent Labs   Lab Test 12/17/22  0621 12/16/22  2207 12/16/22  1406 12/16/22  0723   WBC 6.4  --   --  6.1   HGB 16.4 15.6 16.3 17.1   MCV 89  --   --  91     --   --  268     Most Recent 3 BMP's:Recent Labs   Lab Test 12/17/22  0621 12/16/22  0723    142   POTASSIUM 4.0 3.7   CHLORIDE 106 107   CO2 22 23   BUN 10 12   CR 0.92 1.02   ANIONGAP 10 12   ADELA 9.2 8.8    97      Most Recent 2 LFT's:Recent Labs   Lab Test 12/17/22  0621 12/16/22  0723   AST 25 31   ALT 32 37   ALKPHOS 51 52   BILITOTAL 1.4* 0.5     Most Recent 3 INR's:Recent Labs   Lab Test 12/16/22  0723   INR 1.05           Discharge Orders     No discharge procedures on file.    Examination   Physical Exam   Temp:  [97.4  F (36.3  C)-99.1  F (37.3  C)] 97.7  F (36.5  C)  Pulse:  [69-74] 69  Resp:  [16-20] 16  BP: (111-153)/(74-93) 122/78  SpO2:  [96 %-98 %] 98 %  Wt Readings from Last 1 Encounters:   12/16/22 90.7 kg (200 lb)     Physical Exam  Constitutional:       General: He is not in acute distress.     Appearance: Normal appearance. He is not ill-appearing.   Cardiovascular:      Rate and Rhythm: Normal rate and regular rhythm.      Heart sounds: No murmur heard.  Pulmonary:      Effort: Pulmonary effort is normal. No respiratory distress.      Breath sounds: Normal breath sounds. No wheezing.   Abdominal:      General: Abdomen is flat. There is no distension.      Palpations: Abdomen is soft.      Tenderness: There is no abdominal tenderness. There is no guarding.   Musculoskeletal:         General: No swelling.   Skin:     General: Skin is warm and dry.   Neurological:      General: No focal deficit present.      Mental Status: He is alert.   Psychiatric:         Mood and Affect: Mood normal.         Behavior: Behavior normal.         Please see EMR for more detailed significant labs, imaging, consultant notes etc.    INATE MD, personally saw the patient today and spent greater than 30 minutes discharging this patient.    NATE PEREZ MD  [facility]    CC:No Ref-Primary, Physician

## 2022-12-17 NOTE — PROGRESS NOTES
"PRIMARY DIAGNOSIS: \"GENERIC\" NURSING  OUTPATIENT/OBSERVATION GOALS TO BE MET BEFORE DISCHARGE:  1. ADLs back to baseline: Yes    2. Activity and level of assistance: Ambulating independently.    3. Pain status: Pain free.    4. Return to near baseline physical activity: Yes     Discharge Planner Nurse   Safe discharge environment identified: Yes  Barriers to discharge: Yes; awaiting medical clearance.       Entered by: Alicia Dietz RN 12/16/2022 6:33 PM     Please review provider order for any additional goals.   Nurse to notify provider when observation goals have been met and patient is ready for discharge.    Alert and oriented x 4. VSS. Lung sounds clear. Denies SOB, chest pain and N/V. Denies pain or abdominal discomfort. Receiving 1 L of the banana bag and IV protonix. CIWA- 1. On K protocol; recheck in am. Up independently. Calls appropriately.   "

## 2025-05-09 ENCOUNTER — HOSPITAL ENCOUNTER (EMERGENCY)
Facility: CLINIC | Age: 35
Discharge: HOME | End: 2025-05-09
Attending: EMERGENCY MEDICINE | Admitting: EMERGENCY MEDICINE
Payer: COMMERCIAL

## 2025-05-09 VITALS
OXYGEN SATURATION: 92 % | RESPIRATION RATE: 17 BRPM | HEART RATE: 98 BPM | DIASTOLIC BLOOD PRESSURE: 84 MMHG | BODY MASS INDEX: 28.7 KG/M2 | TEMPERATURE: 98 F | WEIGHT: 200 LBS | SYSTOLIC BLOOD PRESSURE: 125 MMHG

## 2025-05-09 DIAGNOSIS — F10.930 ALCOHOL WITHDRAWAL, UNCOMPLICATED (H): ICD-10-CM

## 2025-05-09 DIAGNOSIS — E86.0 DEHYDRATION: ICD-10-CM

## 2025-05-09 DIAGNOSIS — R74.01 ELEVATED AST (SGOT): ICD-10-CM

## 2025-05-09 DIAGNOSIS — E87.6 HYPOKALEMIA: ICD-10-CM

## 2025-05-09 LAB
ALBUMIN SERPL BCG-MCNC: 4.8 G/DL (ref 3.5–5.2)
ALBUMIN UR-MCNC: 20 MG/DL
ALP SERPL-CCNC: 63 U/L (ref 40–150)
ALT SERPL W P-5'-P-CCNC: 56 U/L (ref 0–70)
ANION GAP SERPL CALCULATED.3IONS-SCNC: 22 MMOL/L (ref 7–15)
APPEARANCE UR: CLEAR
AST SERPL W P-5'-P-CCNC: 56 U/L (ref 0–45)
BACTERIA #/AREA URNS HPF: ABNORMAL /HPF
BASOPHILS # BLD AUTO: 0.1 10E3/UL (ref 0–0.2)
BASOPHILS NFR BLD AUTO: 1 %
BILIRUB SERPL-MCNC: 0.8 MG/DL
BILIRUB UR QL STRIP: NEGATIVE
BUN SERPL-MCNC: 10.4 MG/DL (ref 6–20)
CALCIUM SERPL-MCNC: 9 MG/DL (ref 8.8–10.4)
CHLORIDE SERPL-SCNC: 97 MMOL/L (ref 98–107)
COLOR UR AUTO: ABNORMAL
CREAT SERPL-MCNC: 0.94 MG/DL (ref 0.67–1.17)
EGFRCR SERPLBLD CKD-EPI 2021: >90 ML/MIN/1.73M2
EOSINOPHIL # BLD AUTO: 0.1 10E3/UL (ref 0–0.7)
EOSINOPHIL NFR BLD AUTO: 1 %
ERYTHROCYTE [DISTWIDTH] IN BLOOD BY AUTOMATED COUNT: 11.9 % (ref 10–15)
ETHANOL SERPL-MCNC: 0.07 G/DL
GLUCOSE SERPL-MCNC: 118 MG/DL (ref 70–99)
GLUCOSE UR STRIP-MCNC: NEGATIVE MG/DL
HCO3 SERPL-SCNC: 18 MMOL/L (ref 22–29)
HCT VFR BLD AUTO: 44.7 % (ref 40–53)
HGB BLD-MCNC: 16.3 G/DL (ref 13.3–17.7)
HGB UR QL STRIP: NEGATIVE
IMM GRANULOCYTES # BLD: 0 10E3/UL
IMM GRANULOCYTES NFR BLD: 0 %
KETONES UR STRIP-MCNC: 60 MG/DL
LEUKOCYTE ESTERASE UR QL STRIP: NEGATIVE
LIPASE SERPL-CCNC: 40 U/L (ref 13–60)
LYMPHOCYTES # BLD AUTO: 2 10E3/UL (ref 0.8–5.3)
LYMPHOCYTES NFR BLD AUTO: 19 %
MAGNESIUM SERPL-MCNC: 1.7 MG/DL (ref 1.7–2.3)
MCH RBC QN AUTO: 31.5 PG (ref 26.5–33)
MCHC RBC AUTO-ENTMCNC: 36.5 G/DL (ref 31.5–36.5)
MCV RBC AUTO: 87 FL (ref 78–100)
MONOCYTES # BLD AUTO: 1 10E3/UL (ref 0–1.3)
MONOCYTES NFR BLD AUTO: 9 %
MUCOUS THREADS #/AREA URNS LPF: PRESENT /LPF
NEUTROPHILS # BLD AUTO: 7.1 10E3/UL (ref 1.6–8.3)
NEUTROPHILS NFR BLD AUTO: 70 %
NITRATE UR QL: NEGATIVE
NRBC # BLD AUTO: 0 10E3/UL
NRBC BLD AUTO-RTO: 0 /100
PH UR STRIP: 6.5 [PH] (ref 5–7)
PLATELET # BLD AUTO: 294 10E3/UL (ref 150–450)
POTASSIUM SERPL-SCNC: 3.1 MMOL/L (ref 3.4–5.3)
PROT SERPL-MCNC: 7.3 G/DL (ref 6.4–8.3)
RBC # BLD AUTO: 5.17 10E6/UL (ref 4.4–5.9)
RBC URINE: 1 /HPF
SODIUM SERPL-SCNC: 137 MMOL/L (ref 135–145)
SP GR UR STRIP: 1.02 (ref 1–1.03)
SQUAMOUS EPITHELIAL: <1 /HPF
TSH SERPL DL<=0.005 MIU/L-ACNC: 3.14 UIU/ML (ref 0.3–4.2)
UROBILINOGEN UR STRIP-MCNC: NORMAL MG/DL
WBC # BLD AUTO: 10.2 10E3/UL (ref 4–11)
WBC URINE: <1 /HPF

## 2025-05-09 PROCEDURE — 258N000003 HC RX IP 258 OP 636: Performed by: EMERGENCY MEDICINE

## 2025-05-09 PROCEDURE — 83690 ASSAY OF LIPASE: CPT | Performed by: EMERGENCY MEDICINE

## 2025-05-09 PROCEDURE — 84132 ASSAY OF SERUM POTASSIUM: CPT | Performed by: EMERGENCY MEDICINE

## 2025-05-09 PROCEDURE — 93005 ELECTROCARDIOGRAM TRACING: CPT | Performed by: EMERGENCY MEDICINE

## 2025-05-09 PROCEDURE — 83735 ASSAY OF MAGNESIUM: CPT | Performed by: EMERGENCY MEDICINE

## 2025-05-09 PROCEDURE — 82077 ASSAY SPEC XCP UR&BREATH IA: CPT | Performed by: EMERGENCY MEDICINE

## 2025-05-09 PROCEDURE — 250N000013 HC RX MED GY IP 250 OP 250 PS 637: Performed by: EMERGENCY MEDICINE

## 2025-05-09 PROCEDURE — 36415 COLL VENOUS BLD VENIPUNCTURE: CPT | Performed by: EMERGENCY MEDICINE

## 2025-05-09 PROCEDURE — 250N000011 HC RX IP 250 OP 636: Performed by: EMERGENCY MEDICINE

## 2025-05-09 PROCEDURE — 84443 ASSAY THYROID STIM HORMONE: CPT | Performed by: EMERGENCY MEDICINE

## 2025-05-09 PROCEDURE — 81001 URINALYSIS AUTO W/SCOPE: CPT | Performed by: EMERGENCY MEDICINE

## 2025-05-09 PROCEDURE — 96365 THER/PROPH/DIAG IV INF INIT: CPT

## 2025-05-09 PROCEDURE — 99284 EMERGENCY DEPT VISIT MOD MDM: CPT | Mod: 25

## 2025-05-09 PROCEDURE — 96361 HYDRATE IV INFUSION ADD-ON: CPT

## 2025-05-09 PROCEDURE — 85025 COMPLETE CBC W/AUTO DIFF WBC: CPT | Performed by: EMERGENCY MEDICINE

## 2025-05-09 PROCEDURE — 96375 TX/PRO/DX INJ NEW DRUG ADDON: CPT

## 2025-05-09 RX ORDER — CHLORDIAZEPOXIDE HYDROCHLORIDE 25 MG/1
25 CAPSULE, GELATIN COATED ORAL ONCE
Status: COMPLETED | OUTPATIENT
Start: 2025-05-09 | End: 2025-05-09

## 2025-05-09 RX ORDER — OMEPRAZOLE 20 MG/1
20 CAPSULE, DELAYED RELEASE ORAL DAILY
Qty: 30 CAPSULE | Refills: 0 | Status: SHIPPED | OUTPATIENT
Start: 2025-05-09

## 2025-05-09 RX ORDER — LORAZEPAM 2 MG/ML
2 INJECTION INTRAMUSCULAR ONCE
Status: COMPLETED | OUTPATIENT
Start: 2025-05-09 | End: 2025-05-09

## 2025-05-09 RX ORDER — ONDANSETRON 4 MG/1
4 TABLET, ORALLY DISINTEGRATING ORAL EVERY 8 HOURS PRN
Qty: 15 TABLET | Refills: 0 | Status: SHIPPED | OUTPATIENT
Start: 2025-05-09

## 2025-05-09 RX ORDER — THIAMINE HYDROCHLORIDE 100 MG/ML
100 INJECTION, SOLUTION INTRAMUSCULAR; INTRAVENOUS ONCE
Status: COMPLETED | OUTPATIENT
Start: 2025-05-09 | End: 2025-05-09

## 2025-05-09 RX ORDER — ONDANSETRON 2 MG/ML
4 INJECTION INTRAMUSCULAR; INTRAVENOUS ONCE
Status: COMPLETED | OUTPATIENT
Start: 2025-05-09 | End: 2025-05-09

## 2025-05-09 RX ORDER — CHLORDIAZEPOXIDE HYDROCHLORIDE 25 MG/1
CAPSULE, GELATIN COATED ORAL
Qty: 16 CAPSULE | Refills: 0 | Status: SHIPPED | OUTPATIENT
Start: 2025-05-09

## 2025-05-09 RX ORDER — MAGNESIUM SULFATE HEPTAHYDRATE 40 MG/ML
2 INJECTION, SOLUTION INTRAVENOUS ONCE
Status: COMPLETED | OUTPATIENT
Start: 2025-05-09 | End: 2025-05-09

## 2025-05-09 RX ORDER — POTASSIUM CHLORIDE 1500 MG/1
40 TABLET, EXTENDED RELEASE ORAL ONCE
Status: COMPLETED | OUTPATIENT
Start: 2025-05-09 | End: 2025-05-09

## 2025-05-09 RX ADMIN — SODIUM CHLORIDE 1000 ML: 0.9 INJECTION, SOLUTION INTRAVENOUS at 20:27

## 2025-05-09 RX ADMIN — THIAMINE HYDROCHLORIDE 100 MG: 100 INJECTION, SOLUTION INTRAMUSCULAR; INTRAVENOUS at 20:31

## 2025-05-09 RX ADMIN — CHLORDIAZEPOXIDE HYDROCHLORIDE 25 MG: 25 CAPSULE ORAL at 23:30

## 2025-05-09 RX ADMIN — ONDANSETRON 4 MG: 2 INJECTION, SOLUTION INTRAMUSCULAR; INTRAVENOUS at 20:30

## 2025-05-09 RX ADMIN — LORAZEPAM 2 MG: 2 INJECTION INTRAMUSCULAR; INTRAVENOUS at 20:27

## 2025-05-09 RX ADMIN — SODIUM CHLORIDE 1000 ML: 0.9 INJECTION, SOLUTION INTRAVENOUS at 20:35

## 2025-05-09 RX ADMIN — MAGNESIUM SULFATE HEPTAHYDRATE 2 G: 40 INJECTION, SOLUTION INTRAVENOUS at 22:02

## 2025-05-09 RX ADMIN — POTASSIUM CHLORIDE 40 MEQ: 1500 TABLET, EXTENDED RELEASE ORAL at 21:49

## 2025-05-09 ASSESSMENT — COLUMBIA-SUICIDE SEVERITY RATING SCALE - C-SSRS
1. IN THE PAST MONTH, HAVE YOU WISHED YOU WERE DEAD OR WISHED YOU COULD GO TO SLEEP AND NOT WAKE UP?: NO
2. HAVE YOU ACTUALLY HAD ANY THOUGHTS OF KILLING YOURSELF IN THE PAST MONTH?: NO
6. HAVE YOU EVER DONE ANYTHING, STARTED TO DO ANYTHING, OR PREPARED TO DO ANYTHING TO END YOUR LIFE?: NO

## 2025-05-09 ASSESSMENT — ACTIVITIES OF DAILY LIVING (ADL)
ADLS_ACUITY_SCORE: 52

## 2025-05-09 NOTE — ED TRIAGE NOTES
Patient ambulatory from home reporting hx of alcohol dependence. Patient states he has been on a mancuso since Monday, consuming a liter of vodka a day. Patient last had a beer around 1500. Patient denies SI.     Patient shaky and tachycardic in triage, reports he has a hx of withdrawal seizures.      Triage Assessment (Adult)       Row Name 05/09/25 3910          Triage Assessment    Airway WDL WDL        Respiratory WDL    Respiratory WDL WDL        Skin Circulation/Temperature WDL    Skin Circulation/Temperature WDL WDL        Cardiac WDL    Cardiac WDL X;all     Pulse Rate & Regularity tachycardic        Peripheral/Neurovascular WDL    Peripheral Neurovascular WDL WDL        Cognitive/Neuro/Behavioral WDL    Cognitive/Neuro/Behavioral WDL WDL

## 2025-05-10 LAB
ATRIAL RATE - MUSE: 94 BPM
DIASTOLIC BLOOD PRESSURE - MUSE: 67 MMHG
INTERPRETATION ECG - MUSE: NORMAL
P AXIS - MUSE: 40 DEGREES
PR INTERVAL - MUSE: 128 MS
QRS DURATION - MUSE: 82 MS
QT - MUSE: 376 MS
QTC - MUSE: 470 MS
R AXIS - MUSE: 44 DEGREES
SYSTOLIC BLOOD PRESSURE - MUSE: 128 MMHG
T AXIS - MUSE: 29 DEGREES
VENTRICULAR RATE- MUSE: 94 BPM

## 2025-05-10 NOTE — ED PROVIDER NOTES
EMERGENCY DEPARTMENT ENCOUNTER      NAME: Patrick Ba  AGE: 34 year old male  YOB: 1990  MRN: [id number]  EVALUATION DATE & TIME: No admission date for patient encounter.    PCP: No Ref-Primary, Physician    ED PROVIDER: No Finney M.D.      Chief Complaint   Patient presents with    Alcohol Problem       FINAL IMPRESSION:  1. Alcohol withdrawal, uncomplicated (H)    2. Hypokalemia    3. Dehydration    4. Elevated AST (SGOT)        ED COURSE & MEDICAL DECISION MAKIN.  Alcohol withdrawal.  Enrolled in an outpatient program, no history of alcohol withdrawal seizures despite triage note.  Severe tremors upon awakening in the past but no loss of consciousness, tongue biting, loss of bowel or bladder.  Tremulous on arrival, tachycardic.  Improved after 1 dose Ativan and fluids.  Blood work obtained and shows hypokalemia of 3.1, repleted with p.o. potassium.  Magnesium level 1.7, IV magnesium ordered for repletion.  No suicidal or homicidal ideation, no self-harm.  No indication for inpatient psychiatric placement.  Appears stable for outpatient management.   reviewed, no previous fills for opiates or controlled substances in the last year.  EKG done for tachycardia, sinus rhythm and normal QTc.  Discussed management with patient, discussed medications, return precautions.  Discharge home.      7:14 PM I met with the patient to gather history and to perform my initial exam. I discussed the plan for care while in the Emergency Department.     Pertinent Labs & Imaging studies reviewed. (See chart for details).      Medical Decision Making  I obtained history from patient, I reviewed the EMR as documented in HPI, ED course, and chart review section above and below, Care impacted by chronic conditions/past medical history as documented in HPI, ED course, and chart review section above below, I considered additional work up, including admission, but deferred due to improvement, no requirement for  Hypertension Clinic   Jose Angel Winters was referred to the Clinical Pharmacy Team for his blood pressure management.    Referring Provider: Srinath Petty MD     HYPERTENSION ASSESSMENT    - Patient seen in office today for BP cuff validation    CURRENT PHARMACOTHERAPY  - Lisinopril 20 mg daily    HISTORICAL PHARMACOTHERAPY  - None    Blood Pressure Monitor Validated: Yes  - Has a large arm cuff    SMBP MEASUREMENTS  Date Systolic Diastolic Heart Rate   -      -        Has the patient experienced any low blood pressures since last contact? No  - denies any  symptoms of low blood pressure: lightheadedness, dizziness, falls, blurry vision, clammy/pale skin   Has the patient experienced any high blood pressures since last contact? No  - denies any  symptoms of high blood pressure: headache, chest pain, vision problems    DEVICE ACCURACY TEST  Care team should take five blood pressure readings using a combination of the patient's SMBP device and the office's method of blood pressure measurement.  STEP 1:    A Patient's Monitor left arm    114/75 mmHg     75 HR  B Patient's Monitor left arm     114/64 mmHg     71 HR  C Office's Monitor       right arm     110/72 mmHg     71 HR  D Patient's Monitor      right arm     111/75 mmHg     71 HR  E Office's Monitor       left arm     118/72 mmHg     72 HR    STEP 2:  Part 1: Average measurements B and D - 113/70  Part 2: Compare average of B and D to measurement C - 110/72  Part 3: If the difference is …  --> Less than 5 mm Hg, this device can be used for SMBP  --> Between 6 and 10 mm Hg, proceed to Step 3  --> Greater than 10 mm Hg, replace the device before proceeding with your SMBP program    STEP 3:  Part 1: Average measurements C and E - 114/72  Part 2: Compare average of C and E to measurement D - 111/75  Part 3: If the difference is …  --> Less than or equal to 10 mm Hg, this device can be used for SMBP  --> Greater than 10 mm Hg, replace the device before proceeding  "with your SMBP program    CONCLUSION: This BP monitor [ IS ] acceptable for home BP monitoring.     Vitals:    03/04/24 1134   BP: 110/72   Pulse: 71     RELEVANT LAB RESULTS  Lab Results   Component Value Date    CREATININE 0.9 07/20/2023    BUN 12 07/20/2023     07/20/2023    K 4.3 07/20/2023     07/20/2023    CO2 26 07/20/2023     Lab Results   Component Value Date    CHOL 148 04/10/2023    CHOL 131 07/25/2022    CHOL 145 08/04/2021     Lab Results   Component Value Date    HDL 35.7 (A) 04/10/2023    HDL 33.0 (A) 07/25/2022    HDL 35.1 (A) 08/04/2021     No results found for: \"LDLCALC\"  Lab Results   Component Value Date    TRIG 181 (H) 04/10/2023    TRIG 101 07/25/2022    TRIG 150 (H) 08/04/2021     No components found for: \"CHOLHDL\"  The ASCVD Risk score (Jeison LUNA, et al., 2019) failed to calculate for the following reasons:    Unable to determine if patient is Non-      PATIENT EDUCATION/GOALS  Blood pressure goal is less than 140/90 mmHg    Eat right: Your diet should be rich in fruits, vegetables, potassium, and low-fat dairy products. You should also reduce your intake of sodium and fats, particularly saturated fats.  Maintain a healthy weight: Try to achieve and maintain a healthy weight. If you are unsure what this means for you, please contact our clinic.  Exercise: Try to get at least 30 minutes of aerobic exercise every day.  Moderate your alcohol consumption: Limit your alcohol intake to one drink per day.  Monitor your blood pressure: You should check your blood pressure regularly. Notify our clinic if your blood pressure readings are consistently higher than recommended.    PROVIDER IMPRESSIONS/PLAN:    Assessment/Plan   Problem List Items Addressed This Visit             ICD-10-CM    Essential hypertension I10     1. Blood Pressure: well controlled.   - Blood Pressure monitor is validated for at home use     2 Pharmacologic changes:   CONTINUE: Lisinopril 20 mg " inpatient medical admission., I independently interpreted Radiological studies as documented in Radiology section below. See radiology report for final interpretation., and I discussed the care with another health care provider: N/A    Discharge. I prescribed additional prescription strength medication(s) as charted. I considered admission, but discharged patient after significant clinical improvement.    MIPS (CTPE, Dental pain, Mi, Sinusitis, Asthma/COPD, Head Trauma): Not Applicable    SEPSIS: None    At the conclusion of the encounter I discussed the results of all of the tests and the disposition. The questions were answered. The patient or family acknowledged understanding and was agreeable with the care plan.      CRITICAL CARE:  N/A    HPI    Patient information was obtained from: patient.    Use of : N/A.       Patrick Ba is a 34 year old male who presents alcohol withdrawals.       Patient has been experiencing alcohol withdrawals since this afternoon. He endorses extreme shaking. Reports that he has drank 1 liter per day since Monday. His last drink of hard liquor was at 6-7 AM this morning, he additionally drank 2 beers (16 oz each) since then attempting to relieve symptoms. Endorses nausea and deals with insomnia at baseline and has not slept last 2 days. Of note, patient is moving into a sober house next week and is wanting to detox. Reports that he has history of seizure (1-2 episodes) due to withdrawals 6 months ago. He reports taking CBD gummiest occasionally. Denies recent drug use of smoking. Was hospitalized for hematemesis in 2017 and 2022. Denies personal history of cardiac issue however endorses family history of atrial fibrillation. Denies any known mental health issues, suicidal/homicidal intent, vomiting, and is not taking any prescribed medication. Denies any form of self harm.     REVIEW OF SYSTEMS  All other systems negative unless noted in HPI.    PAST MEDICAL  HISTORY:  No past medical history on file.    PAST SURGICAL HISTORY:  No past surgical history on file.      CURRENT MEDICATIONS:    Current Facility-Administered Medications   Medication Dose Route Frequency Provider Last Rate Last Admin    chlordiazePOXIDE (LIBRIUM) capsule 25 mg  25 mg Oral Once No Finney MD        magnesium sulfate 2 g in 50 mL sterile water intermittent infusion  2 g Intravenous Once No Finney MD 50 mL/hr at 05/09/25 2202 2 g at 05/09/25 2202     Current Outpatient Medications   Medication Sig Dispense Refill    chlordiazePOXIDE (LIBRIUM) 25 MG capsule Day 1: Oral: 50 mg every 6 hours; Day 2: Oral: 50 mg every 12 hours; Day 3: Oral: 50 mg every 24 hours; Day 4: Oral: 25 mg every 24 hours, then discontinue 16 capsule 0    omeprazole (PRILOSEC) 20 MG DR capsule Take 1 capsule (20 mg) by mouth daily. 30 capsule 0    ondansetron (ZOFRAN ODT) 4 MG ODT tab Take 1 tablet (4 mg) by mouth every 8 hours as needed for nausea or vomiting. 15 tablet 0    multivitamin, therapeutic (THERA-VIT) TABS tablet Take 1 tablet by mouth daily      traZODone (DESYREL) 50 MG tablet Take 50 mg by mouth At Bedtime           ALLERGIES:  No Known Allergies    FAMILY HISTORY:  No family history on file.    SOCIAL HISTORY:  Social History     Socioeconomic History    Marital status: Single   Tobacco Use    Smoking status: Never   Substance and Sexual Activity    Alcohol use: Yes     Comment: Alcoholic Drinks/day: 1/2 liter per day of hard alcohol     Drug use: Yes     Types: Marijuana     Social Drivers of Health     Financial Resource Strain: Low Risk  (2/28/2024)    Received from [facility]    Financial Resource Strain     Difficulty of Paying Living Expenses: 3   Food Insecurity: No Food Insecurity (2/28/2024)    Received from [employer] [facility]    Food Insecurity     Do you worry your food will run out before you are able to buy more?: 1  daily  Continue all meds under the continuation of care with the referring provider and clinical pharmacy team.     3. Counseling Points:  - I have counseled this patient on appropriate blood pressure monitor techniques, provided a blood pressure monitor log, and have advised the patient to contact me with questions regarding their blood pressure.     Pharmacist Follow Up: 3/18 9:30 AM for lab & BP follow up         Relevant Orders    Follow Up In [facility] - Pharmacy     Thank you,   Marissa Patrick, PharmD, [facility]    Continue all meds under the continuation of care with the referring provider and clinical pharmacy team.     Transportation Needs: No Transportation Needs (2/28/2024)    Received from [facility] [employer] [facility] [employer] [facility]    Transportation Needs     Does lack of transportation keep you from medical appointments?: 1     Does lack of transportation keep you from work, meetings or getting things that you need?: 1   Social Connections: Socially Integrated (2/28/2024)    Received from [place] [employer] [facility]    Social Connections     Do you often feel lonely or isolated from those around you?: 0   Housing Stability: Low Risk  (2/28/2024)    Received from [place] [employer] [facility]    Housing Stability     What is your housing situation today?: 1       VITALS:  Patient Vitals for the past 24 hrs:   BP Temp Temp src Pulse Resp SpO2 Weight   05/09/25 2130 133/65 -- -- 90 -- -- --   05/09/25 2115 (!) 147/64 -- -- 91 -- 97 % --   05/09/25 2100 108/60 -- -- 88 -- 95 % --   05/09/25 2036 131/83 -- -- 87 -- 98 % --   05/09/25 1810 (!) 145/98 98  F (36.7  C) Temporal 118 20 98 % 90.7 kg (200 lb)       PHYSICAL EXAM    VITAL SIGNS: /65   Pulse 90   Temp 98  F (36.7  C) (Temporal)   Resp 20   Wt 90.7 kg (200 lb)   SpO2 97%   BMI 28.70 kg/m    Physical Exam  Vitals and nursing note reviewed.   Constitutional:       Appearance: He is not toxic-appearing or diaphoretic.   HENT:      Head: Normocephalic and atraumatic.      Mouth/Throat:      Mouth: Mucous membranes are dry.   Eyes:      General: No scleral icterus.        Right eye: No discharge.         Left eye: No discharge.      Pupils: Pupils are equal, round, and reactive to light.   Cardiovascular:      Rate and Rhythm: Regular rhythm. Tachycardia present.   Pulmonary:      Effort: Pulmonary effort is normal. No respiratory distress.      Breath sounds: Normal breath sounds. No wheezing or rales.   Abdominal:      General: There is no distension.      Palpations: Abdomen is soft.      Tenderness: There is no abdominal  tenderness. There is no guarding or rebound.   Musculoskeletal:         General: No swelling or deformity.      Cervical back: Neck supple. No rigidity.   Skin:     General: Skin is warm and dry.      Capillary Refill: Capillary refill takes less than 2 seconds.      Findings: No bruising or erythema.   Neurological:      General: No focal deficit present.      Mental Status: He is alert and oriented to person, place, and time. Mental status is at baseline.      Comments: No slurred speech, following commands spontaneously. No facial droop.  Tremulous to upper extremities.  Minimal tongue fasciculations present.   Psychiatric:         Behavior: Behavior normal.         Thought Content: Thought content normal.         Judgment: Judgment normal.      Comments: Slightly anxious appearing.         LABS  Labs Ordered and Resulted from Time of ED Arrival to Time of ED Departure   COMPREHENSIVE METABOLIC PANEL - Abnormal       Result Value    Sodium 137      Potassium 3.1 (*)     Carbon Dioxide (CO2) 18 (*)     Anion Gap 22 (*)     Urea Nitrogen 10.4      Creatinine 0.94      GFR Estimate >90      Calcium 9.0      Chloride 97 (*)     Glucose 118 (*)     Alkaline Phosphatase 63      AST 56 (*)     ALT 56      Protein Total 7.3      Albumin 4.8      Bilirubin Total 0.8     ETHYL ALCOHOL LEVEL - Abnormal    Alcohol ethyl 0.07 (*)    ROUTINE UA WITH MICROSCOPIC REFLEX TO CULTURE - Abnormal    Color Urine Light Yellow      Appearance Urine Clear      Glucose Urine Negative      Bilirubin Urine Negative      Ketones Urine 60 (*)     Specific Gravity Urine 1.023      Blood Urine Negative      pH Urine 6.5      Protein Albumin Urine 20 (*)     Urobilinogen Urine Normal      Nitrite Urine Negative      Leukocyte Esterase Urine Negative      Bacteria Urine Few (*)     Mucus Urine Present (*)     RBC Urine 1      WBC Urine <1      Squamous Epithelials Urine <1     LIPASE - Normal    Lipase 40     TSH WITH FREE T4 REFLEX - Normal     TSH 3.14     MAGNESIUM - Normal    Magnesium 1.7     CBC WITH PLATELETS AND DIFFERENTIAL    WBC Count 10.2      RBC Count 5.17      Hemoglobin 16.3      Hematocrit 44.7      MCV 87      MCH 31.5      MCHC 36.5      RDW 11.9      Platelet Count 294      % Neutrophils 70      % Lymphocytes 19      % Monocytes 9      % Eosinophils 1      % Basophils 1      % Immature Granulocytes 0      NRBCs per 100 WBC 0      Absolute Neutrophils 7.1      Absolute Lymphocytes 2.0      Absolute Monocytes 1.0      Absolute Eosinophils 0.1      Absolute Basophils 0.1      Absolute Immature Granulocytes 0.0      Absolute NRBCs 0.0           RADIOLOGY  No orders to display      NA      EKG:    EKG obtained at 2248 and shows sinus rhythm rate 94, Qtc 470, compared to previous EKG on N/A, none available. I have independently reviewed and interpreted today's EKG, pending Cardiologist read.       PROCEDURES:  N/A      MEDICATIONS GIVEN IN THE EMERGENCY:  Medications   magnesium sulfate 2 g in 50 mL sterile water intermittent infusion (2 g Intravenous $New Bag 5/9/25 2202)   chlordiazePOXIDE (LIBRIUM) capsule 25 mg (has no administration in time range)   sodium chloride 0.9% BOLUS 1,000 mL (1,000 mLs Intravenous $New Bag 5/9/25 2027)   LORazepam (ATIVAN) injection 2 mg (2 mg Intravenous $Given 5/9/25 2027)   ondansetron (ZOFRAN) injection 4 mg (4 mg Intravenous $Given 5/9/25 2030)   sodium chloride 0.9% BOLUS 1,000 mL (1,000 mLs Intravenous $New Bag 5/9/25 2035)   thiamine (B-1) injection 100 mg (100 mg Intravenous $Given 5/9/25 2031)   potassium chloride [facility] ER (KLOR-CON M20) CR tablet 40 mEq (40 mEq Oral $Given 5/9/25 2149)       NEW PRESCRIPTIONS STARTED AT TODAY'S ER VISIT  New Prescriptions    CHLORDIAZEPOXIDE (LIBRIUM) 25 MG CAPSULE    Day 1: Oral: 50 mg every 6 hours; Day 2: Oral: 50 mg every 12 hours; Day 3: Oral: 50 mg every 24 hours; Day 4: Oral: 25 mg every 24 hours, then discontinue    OMEPRAZOLE (PRILOSEC) 20 MG DR CAPSULE     Take 1 capsule (20 mg) by mouth daily.    ONDANSETRON (ZOFRAN ODT) 4 MG ODT TAB    Take 1 tablet (4 mg) by mouth every 8 hours as needed for nausea or vomiting.        I, Desi Holt, am serving as a scribe to document services personally performed by No Finney MD, based on my observations and the provider's statements to me.  I, No Finney MD, attest that Desi Holt is acting in a scribe capacity, has observed my performance of the services and has documented them in accordance with my direction.     No Finney MD  Emergency Medicine  [facility] EMERGENCY ROOM  Critical access hospital[address] [facility] [id number]  [phone]  Dept: [phone]             No Finney MD  05/09/25 1420

## 2025-05-10 NOTE — ED NOTES
MD Rey approved patient to be discharge via [employer]. Patient ambulates appropriately. AOX4. Feels safe going home.

## 2025-05-10 NOTE — DISCHARGE INSTRUCTIONS
Continue your multivitamin and try to increase the potassium in your diet.  Your labs show dehydration and mildly low potassium and slightly elevated AST (common with drinking alcohol). The other liver enzymes are normal.  Take omeprazole daily to prevent gastritis/stomach issues due to alcohol.